# Patient Record
Sex: MALE | Race: WHITE
[De-identification: names, ages, dates, MRNs, and addresses within clinical notes are randomized per-mention and may not be internally consistent; named-entity substitution may affect disease eponyms.]

---

## 2021-01-04 ENCOUNTER — HOSPITAL ENCOUNTER (OUTPATIENT)
Dept: HOSPITAL 53 - M SOG | Age: 21
End: 2021-01-04
Attending: ORTHOPAEDIC SURGERY
Payer: OTHER GOVERNMENT

## 2021-01-04 DIAGNOSIS — M25.711: Primary | ICD-10-CM

## 2021-01-05 NOTE — REP
INDICATION:

SUPERIOR GLENOID LABRUM LESION OF RIGHT SHOULDER.



COMPARISON:

None.



TECHNIQUE:

Internal rotation, external rotation, and Y-view of the right and left shoulder.



FINDINGS:

Left shoulder demonstrates normal appearance to the acromioclavicular and glenohumeral

joints.  Subacromial space is normal.  No periarticular calcifications or loose bodies

are identified.  Surrounding soft tissues are normal.



Right shoulder demonstrates bulky inferior osteophyte along the distal clavicle along

with grade 3 acromioclavicular joint separation which requires correlation.  The

glenohumeral joint is intact and normal.  There is no evidence for acute fracture.



IMPRESSION:

1. Right shoulder demonstrates bulky inferior osteophyte along the distal clavicle and

evidence for grade 3 acromioclavicular joint separation requires correlation.

2. Left shoulder appears normal.





<Electronically signed by Serafin Cm > 01/04/21 [id number]

## 2021-03-10 ENCOUNTER — HOSPITAL ENCOUNTER (OUTPATIENT)
Dept: HOSPITAL 53 - M SDC | Age: 21
Discharge: HOME | End: 2021-03-10
Attending: ORTHOPAEDIC SURGERY
Payer: COMMERCIAL

## 2021-03-10 VITALS — WEIGHT: 273 LBS | HEIGHT: 73 IN | BODY MASS INDEX: 36.18 KG/M2

## 2021-03-10 VITALS — DIASTOLIC BLOOD PRESSURE: 73 MMHG | SYSTOLIC BLOOD PRESSURE: 129 MMHG

## 2021-03-10 DIAGNOSIS — S43.101A: Primary | ICD-10-CM
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
## 2021-03-10 DIAGNOSIS — F17.218: ICD-10-CM

## 2021-03-10 DIAGNOSIS — S43.431A: ICD-10-CM
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
## 2021-03-10 PROCEDURE — 76000 FLUOROSCOPY <1 HR PHYS/QHP: CPT

## 2021-03-10 PROCEDURE — 23552 OPTX ACRCLV DSLC AQ/CHRN GRF: CPT

## 2021-03-10 PROCEDURE — 29822 SHO ARTHRS SRG LMTD DBRDMT: CPT

## 2021-03-10 PROCEDURE — 64415 NJX AA&/STRD BRCH PLXS IMG: CPT

## 2021-03-10 NOTE — REP
INDICATION:

RIGHT SHOULDER RECONSRUCTION.



COMPARISON:

None.



TECHNIQUE:

Two intraoperative fluoroscopic images of the right shoulder.



FINDINGS:

Right shoulder demonstrates relatively normal appearance to the acromioclavicular and

glenohumeral joints by fluoroscopic evaluation.  Surgical clips overlies the scapula.

Total fluoroscopic time 18 seconds.



IMPRESSION:

Status post right shoulder reconstruction.





<Electronically signed by Serafin Cm > 03/10/21 1125

## 2021-03-10 NOTE — ROOPDOC
[facility] Report Of Operation


Report of Operation


DATE OF PROCEDURE: 3/10/21





PREPROCEDURE DIAGNOSES: Right acromioclavicular joint separation and posterior 

labral tear





POSTPROCEDURE DIAGNOSES: Right acromioclavicular joint separation and posterior 

labral tear.





PROCEDURE: Right acromioclavicular ligament reconstruction. 





SURGEON: Kena Orellana MD





ASSISTANT: Not applicable





ANESTHESIA: Gen. anesthesia and a block by Dr FRANCIS.





ESTIMATED BLOOD LOSS: Approximately 50 mL. 





COMPLICATIONS: None. 





REMARKS: None.





PROCEDURE NOTE: This 20-year-old man had a moderate to high grade 

acromioclavicular joint injury. He wished to go ahead with reconstruction. He 

also had a small posterior labral tear. We discussed the pros and cons risks and

benefits of going ahead with right acromioclavicular joint reconstruction with 

allograft tissue was possibly posterior labral repair. He wished to go ahead. 

Reminded him of the risks and benefits pros and cons. Marked right upper 

extremity and the patient received a block prior to the operation. 





DESCRIPTION OF PROCEDURE: Patient was brought to the operating room theater. 

They were administered general anesthetic. They were administered 2 g of IV 

Ancef prior to the start of the case. The patient was placed supine on the 

beachchair positioner set up at a 50 angle. All bony prominences were padded. 

Sequential devices were placed in the legs. The limb was prepped and draped in 

the usual sterile fashion. I used chlorhexidine-based prep solution allowing ov

er 3 minutes preparatory solution drying prior to draping. Preoperative timeout 

was performed confirming site the patient surgery.





I began by making a standard posterior arthroscopy portal inserting the 

arthroscope into the intra-articular portion the right shoulder. I performed a 

full diagnostic arthroscopy. Cartilage on both sides of the joint was normal. 

Undersurface the rotator cuff tendon appeared normal as did the subscapularis 

and middle glenohumeral ligament. The labrum appeared normal at all aspects 

however there was a small less than 3 mm fraying at the posterior superior 

aspect of the labrum that I elected to leave alone. No obvious tears of the 

labrum. I inserted one cannula 5.0 mm as well as one 8.3 mm cannula just 

anterior distal and lateral to this through the rotator interval. I used shaving

instrument as well as cautery to open up the rotator interval as well as yudelka

ntify the lateral aspect of the coracoid. I dissected under the coracoid surface

to the base of the coracoid just to the flare. Used the shaver to roughen up the

under surface of the coracoid for graft healing. Used the 70 arthroscope to do 

this.





I then identified the medial aspect of the coracoid with spinal needle. I made 

an anterior to posterior incision at the superior aspect of the clavicle 1.5 

inches long at this point. Carried this dissection down through skin and 

subcutaneous tissue achieving meticulous hemostasis. Identified the superior 

aspect of the clavicle. I used the Slide drill guide to pass a guidepin from 

the mid aspect of the clavicle down and through the coracoid process in the mid 

aspect of this. His coracoid is quite thin tapering to nearly of a sharp point 

and I did manage to get the guide pin in the middle of this after 4 attempts. I 

then removed the inner side part of the drill pin and then passed the nitinol 

wire through the drill bit and out the jaxon-inferior portal. Then passed the 

dog bone Arthrex suture system with this nitinol wire up through the coracoid 

and through the drill hole in the clavicle, with the button sitting on the 

inferior surface of the coracoid. I did have to create one other small drill 

hole in the clavicle to reposition the pin originally. Sutures were brought up 

and through, with a 2cm bony bridge between the original and final drill hole in

the clavicle.





I then passed a dilator and switching stick posterior to the clavicle as well as

medial to the coracoid. Then passed a fiber stick suture through this again out 

the jaxon-inferior lateral portal. I did the same thing with another tiger 

stick anterior to the clavicle and then lateral to the coracoid. I selected a 

tibialis anterior allograft. I whipstitched each end using #2 fiber sutures. 

Then passed first the limb posterior to the clavicle and medial to the coracoid.

Then passed the second limb using a shuttling sutures lateral to the coracoid 

and anterior to the clavicle.





I used upper pressure on the humerus and intra op fluoroscopy to ensure that my 

reduction was appropriate, with downward pressure using a spade instrument at t

he lateral aspect of the clavicle. I then placed secondary dog bone button at 

the superior aspect of the clavicle and secured this down tightly in place and 

cut the sutures short, tying blue to blue and white to white sutures. Graft was 

then tied to itself with the limbs overlapping using again #2 FiberWire suture 

and then wrapped over top of itself once again and locked in place using 

figure-of-eight #2 FiberWire sutures of the limbs cut short.





And took x-rays to confirm proper reduction and clinically the acromioclavicular

joint appeared well reduced. 





Wounds were thoroughly irrigated using normal saline. Subcutaneous tissues were 

closed with interrupted 2-0 Vicryl sutures and skin with 3-0 Monocryl. Skin was 

cleaned with wet to dry dressing followed by adaptic, Steri-Strips Adaptic 4 x 8

gauze and ABD dressings held in place with cloth tape. Patient's upper extremity

was placed into a sling, woken up from general anesthetic and transferred off 

the operating room table and taken to postanesthetic care unit in stable 

condition.





All sponge, needle, sponge counts are correct, estimated blood loss 50 mL. No 

complications





The patient is to be in the sling full-time first 2 weeks that they may do elbow

hand and wrist exercises. Patient will be discharged home according to day 

surgery criteria. Prescription has been sent to the pharmacy of choice 

electronically. Follow up in 2 weeks' time in the office.











KENA ORELLANA MD              Mar 10, 2021 12:20

## 2021-04-20 ENCOUNTER — HOSPITAL ENCOUNTER (OUTPATIENT)
Dept: HOSPITAL 53 - M SOG | Age: 21
End: 2021-04-20
Attending: ORTHOPAEDIC SURGERY
Payer: COMMERCIAL

## 2021-04-20 DIAGNOSIS — Z47.89: Primary | ICD-10-CM

## 2021-04-20 NOTE — REP
INDICATION:

ORTHO AFTERCARE.



COMPARISON:

Comparison shoulder radiographs 4 January 2021.



TECHNIQUE:

Four views.



FINDINGS:

Four views of the right shoulder demonstrate that the patient is status post

coracoclavicular ligament revision and replacement.  There are metallic anchor devices

along the inferior aspect of the coracoid process and the superior aspect of the

clavicle.  The distal clavicle alignment relative to the acromion process is improved

compared to the January 4, 2021 study.  There is some chronic bony hypertrophy along

the inferior aspect of the distal clavicle.  The glenohumeral articulation remains

normally aligned.



IMPRESSION:

Status post open reduction internal fixation for AC separation injury.





<Electronically signed by Fuentes Slater > 04/20/21 [id number]